# Patient Record
Sex: FEMALE | Race: WHITE | Employment: FULL TIME | URBAN - NONMETROPOLITAN AREA
[De-identification: names, ages, dates, MRNs, and addresses within clinical notes are randomized per-mention and may not be internally consistent; named-entity substitution may affect disease eponyms.]

---

## 2024-03-19 ENCOUNTER — HOSPITAL ENCOUNTER (EMERGENCY)
Facility: HOSPITAL | Age: 59
Discharge: HOME | End: 2024-03-19
Attending: EMERGENCY MEDICINE
Payer: COMMERCIAL

## 2024-03-19 VITALS
TEMPERATURE: 98.1 F | SYSTOLIC BLOOD PRESSURE: 132 MMHG | DIASTOLIC BLOOD PRESSURE: 78 MMHG | RESPIRATION RATE: 17 BRPM | HEIGHT: 64 IN | HEART RATE: 59 BPM | BODY MASS INDEX: 26.92 KG/M2 | WEIGHT: 157.7 LBS | OXYGEN SATURATION: 97 %

## 2024-03-19 DIAGNOSIS — K08.89 PAIN, DENTAL: Primary | ICD-10-CM

## 2024-03-19 PROCEDURE — 6370000000 HC RX 637 (ALT 250 FOR IP): Performed by: EMERGENCY MEDICINE

## 2024-03-19 PROCEDURE — 99283 EMERGENCY DEPT VISIT LOW MDM: CPT

## 2024-03-19 RX ORDER — HYDROCODONE BITARTRATE AND ACETAMINOPHEN 5; 325 MG/1; MG/1
1 TABLET ORAL
Status: COMPLETED | OUTPATIENT
Start: 2024-03-19 | End: 2024-03-19

## 2024-03-19 RX ORDER — IBUPROFEN 600 MG/1
600 TABLET ORAL
Status: COMPLETED | OUTPATIENT
Start: 2024-03-19 | End: 2024-03-19

## 2024-03-19 RX ADMIN — HYDROCODONE BITARTRATE AND ACETAMINOPHEN 1 TABLET: 5; 325 TABLET ORAL at 10:39

## 2024-03-19 RX ADMIN — IBUPROFEN 600 MG: 600 TABLET, FILM COATED ORAL at 10:39

## 2024-03-19 ASSESSMENT — PAIN SCALES - GENERAL
PAINLEVEL_OUTOF10: 8
PAINLEVEL_OUTOF10: 4

## 2024-03-19 ASSESSMENT — PAIN DESCRIPTION - ORIENTATION: ORIENTATION: RIGHT

## 2024-03-19 ASSESSMENT — PAIN - FUNCTIONAL ASSESSMENT: PAIN_FUNCTIONAL_ASSESSMENT: 0-10

## 2024-03-19 ASSESSMENT — PAIN DESCRIPTION - DESCRIPTORS: DESCRIPTORS: ACHING

## 2024-03-19 ASSESSMENT — PAIN DESCRIPTION - LOCATION: LOCATION: FACE

## 2024-03-19 ASSESSMENT — PAIN DESCRIPTION - PAIN TYPE: TYPE: ACUTE PAIN

## 2024-03-19 NOTE — ED NOTES
Pt reports understanding of d/c instructions. PT in NAD and voices she will f/u with dentist this afternoon.

## 2024-03-19 NOTE — DISCHARGE INSTRUCTIONS
You were seen in the ER for your dental pain. Thankfully, you did not have any signs of a dangerous dental infection that requires CT imaging or IV antibiotics. Antibiotics will improve your pain, but ultimately this will require a dental intervention. Since you are going to see the dentist today and there is not an emergent need to get antibiotics, it is better to leave that decision to them (as well as the prescription of narcotic pain medications). We did give you a dose of narcotic here to control your pain (along with ibuprofen).    You can take tylenol or ibuprofen for aches and pains for the rest of the day until you see your dentist and likely the next few days. You can alternate these every 3 hours so that you always have something on board. For instance, you can take tylenol at 9am, ibuprofen at noon, tylenol again at 3pm and ibuprofen again at 6pm. Take in plenty of water to stay hydrated. If your dentist gives you narcotic pain medications, save this for severe pain or pain preventing sleep.    Return to the ER for any fevers, difficulty breathing, difficulty opening your mouth or turning your neck, or any other new or concerning symptoms.         Thank you!  Thank you for allowing me to care for you in the emergency department. It is my goal to provide you with excellent care.  Please fill out the survey that will come to you by mail or email since we listen to your feedback!     Below you will find a list of your tests from today's visit.  Should you have any questions, please do not hesitate to call the emergency department.    Labs  No results found for this or any previous visit (from the past 12 hour(s)).    Radiologic Studies  No orders to display     ------------------------------------------------------------------------------------------------------------  The exam and treatment you received in the Emergency Department were for an urgent problem and are not intended as complete care. It is

## 2024-03-19 NOTE — ED PROVIDER NOTES
EMERGENCY DEPARTMENT HISTORY AND PHYSICAL EXAM      Date: 3/19/2024  Patient Name: Lydia Hall      History of Presenting Illness     Chief Complaint   Patient presents with    Facial Pain       History Provided By: patient    HPI: Lydia Hall, 58 y.o. female with a past medical history significant for none presents to the ED with cc of facial pain. Pt endorses R-sided facial pain for past 2d. Worse today, called dentist for appointment this afternoon. No trismus, neck pain or stiffness, F/C/N/V/D. Took ibuprofen when she woke up at 6am but pain is worse now.    There are no other complaints, changes, or physical findings at this time.    PCP: No primary care provider on file.    Current Facility-Administered Medications   Medication Dose Route Frequency Provider Last Rate Last Admin    HYDROcodone-acetaminophen (NORCO) 5-325 MG per tablet 1 tablet  1 tablet Oral NOW Jax Clark MD        ibuprofen (ADVIL;MOTRIN) tablet 600 mg  600 mg Oral NOW Jax Clark MD         No current outpatient medications on file.       Past History     Past Medical History:  Past Medical History:   Diagnosis Date    POTS (postural orthostatic tachycardia syndrome)        Past Surgical History:  History reviewed. No pertinent surgical history.    Family History:  History reviewed. No pertinent family history.    Social History:       Allergies:  No Known Allergies      Review of Systems   Constitutional: Negative except as in HPI.  Eyes: Negative except as in HPI.  ENT: Negative except as in HPI.  Cardiovascular: Negative except as in HPI.  Respiratory: Negative except as in HPI.  Gastrointestinal: Negative except as in HPI.  Genitourinary: Negative except as in HPI.  Musculoskeletal: Negative except as in HPI.  Integumentary: Negative except as in HPI.  Neurological: Negative except as in HPI.  Psychiatric: Negative except as in HPI.  Endocrine: Negative except as in HPI.  Hematologic/Lymphatic: Negative except

## 2024-03-19 NOTE — ED TRIAGE NOTES
Pt reports right sided facial pain and selling. T reports pain for 2-3 days with swelling beginning this morning. PT in NAD